# Patient Record
(demographics unavailable — no encounter records)

---

## 2024-11-08 NOTE — REASON FOR VISIT
[Follow-up Visit ___] : a follow-up visit  for [unfilled] [Follow-Up - Clinic] : a clinic follow-up of [FreeTextEntry2] : h/o hosp admit for rapid aflutter, now preop dental work, last seen 6/2020, has occas palps, no new sx

## 2024-11-08 NOTE — HISTORY OF PRESENT ILLNESS
[FreeTextEntry1] : 62-year-old man Here for follow-up He is on active surveillance for prostate cancer Rising PSA prompted repeat MRI and PET PSMA: both were unremarkable Started on Finasteride Jan 2023 Voiding better with finasteride Denies LUTS today Started Zepbound in 2024- lost 14 lbs   Prostate MRI 04/2024  Prostate, MRI target 1 left apex , biopsy  Adenocarcinoma of the prostate, Prognostic Grade Group 1 ([place]  score 3+3=6) involving 5% (0.5 mm in length) of 1 of 3 core(s).  PSMA PET/CT (Feb 2023): no foci of abnormal uptake in prostate, no PSMA-avid regional or distant metastases MRI Prostate (Feb 2023): Stable 10mm left anterior distal apex transition zone lesion 10mm PIRADS 2  PSA history 1.88 (free 19%) Sep 2024 2.32 (16 % free) Oct 2023 3.00 (16% free) Apr 2023 6.10 (22% free) Jan 2023 5.44(19% free) Oct 2022 4.64 (19% free) Nov 2021 4.82 (20% free) May 2021 4.48 (21% free) Feb 2021 4.22 (20% Free) Dec 2020 3.07 (25.1 % Free) Jul 2018 3.07 (25.4% Free) Jun 2017 2.61 (24.4%) Apr 2016 3.19 Jan 2015

## 2024-11-08 NOTE — ASSESSMENT
[FreeTextEntry1] : Prostate cancer on active surveillance  Continue Finasteride  plan 1) PSA today 2) 6 months follow-up

## 2025-05-11 NOTE — ASSESSMENT
[FreeTextEntry1] : Prostate cancer on active surveillance  Continue Finasteride  plan 1) PSA today 2) 6 months follow-up 3) next MRI will be ordered during next visit

## 2025-05-11 NOTE — HISTORY OF PRESENT ILLNESS
[FreeTextEntry1] : 62-year-old man Active surveillance for prostate cancer Rising PSA prompted repeat MRI and PET PSMA: both were unremarkable Started on Finasteride Jan 2023 for elevated PSA Voiding better with finasteride  No LUTS today On Zepbound since 2024- lost 56 pounds so far  PVR today: 0cc  Prostate MRI 04/2024 Prostate, MRI target 1 left apex , biopsy  Adenocarcinoma of the prostate, Prognostic Grade Group 1 ([place]  score 3+3=6) involving 5% (0.5 mm in length) of 1 of 3 core(s).  PSMA PET/CT (Feb 2023): no foci of abnormal uptake in prostate, no PSMA-avid regional or distant metastases MRI Prostate (Feb 2023): Stable 10mm left anterior distal apex transition zone lesion 10mm PIRADS 2  PSA history 1.79 Nov 2024 1.88 (free 19%) Sep 2024 2.32 (16 % free) Oct 2023 3.00 (16% free) Apr 2023 6.10 (22% free) Jan 2023 5.44(19% free) Oct 2022 4.64 (19% free) Nov 2021 4.82 (20% free) May 2021 4.48 (21% free) Feb 2021 4.22 (20% Free) Dec 2020 3.07 (25.1 % Free) Jul 2018 3.07 (25.4% Free) Jun 2017 2.61 (24.4%) Apr 2016 3.19 Jan 2015

## 2025-05-11 NOTE — HISTORY OF PRESENT ILLNESS
[FreeTextEntry1] : 62-year-old man Active surveillance for prostate cancer Rising PSA prompted repeat MRI and PET PSMA: both were unremarkable Started on Finasteride Jan 2023 for elevated PSA Voiding better with finasteride  No LUTS today On Zepbound since 2024- lost 56 pounds so far  PVR today: 0cc  Prostate MRI 04/2024 Prostate, MRI target 1 left apex , biopsy  Adenocarcinoma of the prostate, Prognostic Grade Group 1 ([place]  score 3+3=6) involving 5% (0.5 mm in length) of 1 of 3 core(s).  PSMA PET/CT (Feb 2023): no foci of abnormal uptake in prostate, no PSMA-avid regional or distant metastases MRI Prostate (Feb 2023): Stable 10mm left anterior distal apex transition zone lesion 10mm PIRADS 2  PSA history 1.79 Nov 2024 1.88 (free 19%) Sep 2024 2.32 (16 % free) Oct 2023 3.00 (16% free) Apr 2023 6.10 (22% free) Jan 2023 5.44(19% free) Oct 2022 4.64 (19% free) Nov 2021 4.82 (20% free) May 2021 4.48 (21% free) Feb 2021 4.22 (20% Free) Dec 2020 3.07 (25.1 % Free) Jul 2018 3.07 (25.4% Free) Jun 2017 2.61 (24.4%) Apr 2016 3.19 Jan 2015